# Patient Record
Sex: MALE | Race: WHITE | NOT HISPANIC OR LATINO | Employment: FULL TIME | URBAN - METROPOLITAN AREA
[De-identification: names, ages, dates, MRNs, and addresses within clinical notes are randomized per-mention and may not be internally consistent; named-entity substitution may affect disease eponyms.]

---

## 2024-07-17 NOTE — PROGRESS NOTES
"Chief Complaint  Establish Care (New pt - fasting ) and Sleeping Problem (Requesting referral to sleep med - pt reports he had a sleep study ~2006 and was told he had an abnormal brain wave, but has never had a seizure so he did not continue to fu. Pt reports \"terrible sleep\" and snoring)    Subjective        Jaime Newton presents to [facility]  History of Present Illness  Jaime Newton is a 23 y.o. male who presents to the clinic today to establish care.  He denies significant past medical history.  He does have a family history of heart attack and stroke in his father. Father has history of alcohol abuse and smoking.  Patient does fluctuate between chew tobacco and vaping.    He has had sleep issues. He had a sleep study in 2006 that showed and an abnormal brain wave. He saw a specialist, but no concern given he did not have any seizures.  He has been waking up multiple times in the evening and found to be gasping for air and his significant other has also noticed snoring.  He has been wearing a watch that has been tracking his sleep.  He denies daytime drowsiness, but does get more tired earlier in the evenings around 6-7 pm. He denies trouble getting to sleep. He denies anxiety.       Objective   Vital Signs:  /76   Pulse 74   Temp 98 °F (36.7 °C)   Ht 177.8 cm (70\")   Wt 111 kg (244 lb)   SpO2 98%   BMI 35.01 kg/m²   Estimated body mass index is 35.01 kg/m² as calculated from the following:    Height as of this encounter: 177.8 cm (70\").    Weight as of this encounter: 111 kg (244 lb).             Physical Exam  Vitals reviewed.   Constitutional:       General: He is not in acute distress.     Appearance: Normal appearance. He is not ill-appearing, toxic-appearing or diaphoretic.   HENT:      Head: Normocephalic and atraumatic.   Eyes:      General: No scleral icterus.        Right eye: No discharge.         Left eye: No discharge.      Extraocular Movements: " Extraocular movements intact.   Pulmonary:      Effort: Pulmonary effort is normal. No respiratory distress.   Neurological:      General: No focal deficit present.      Mental Status: He is alert and oriented to person, place, and time.      Motor: No weakness.      Gait: Gait normal.   Psychiatric:         Mood and Affect: Mood normal.         Behavior: Behavior normal.        Result Review :                     Assessment and Plan     Diagnoses and all orders for this visit:    1. Snoring (Primary)  -     Ambulatory Referral to Sleep Medicine    2. Screening for deficiency anemia  -     Cancel: CBC w AUTO Differential  -     CBC w AUTO Differential    3. Screening for endocrine, metabolic and immunity disorder  -     Cancel: Comprehensive metabolic panel  -     Comprehensive metabolic panel    4. Screening for lipid disorders  -     Cancel: Lipid Panel  -     Lipid Panel    5. Screening for diabetes mellitus  -     Cancel: Hemoglobin A1c  -     Hemoglobin A1c    6. Screening for thyroid disorder  -     Cancel: TSH  -     TSH    7. Need for hepatitis C screening test  -     Hepatitis C antibody      Given apneic episodes and snoring, patient has been referred to sleep medicine.  Routine labs obtained today.  Plan for routine physical in 6 months.         Follow Up     Return in about 6 months (around 1/18/2025) for Annual physical- labs 1-2 weeks prior.  Patient was given instructions and counseling regarding his condition or for health maintenance advice. Please see specific information pulled into the AVS if appropriate.       Electronically signed by CONNIE Peter, 07/18/24, 3:23 PM EDT.

## 2024-07-18 ENCOUNTER — OFFICE VISIT (OUTPATIENT)
Dept: FAMILY MEDICINE CLINIC | Facility: CLINIC | Age: 24
End: 2024-07-18
Payer: COMMERCIAL

## 2024-07-18 VITALS
BODY MASS INDEX: 34.93 KG/M2 | DIASTOLIC BLOOD PRESSURE: 76 MMHG | TEMPERATURE: 98 F | HEIGHT: 70 IN | OXYGEN SATURATION: 98 % | HEART RATE: 74 BPM | WEIGHT: 244 LBS | SYSTOLIC BLOOD PRESSURE: 116 MMHG

## 2024-07-18 DIAGNOSIS — Z13.228 SCREENING FOR ENDOCRINE, METABOLIC AND IMMUNITY DISORDER: ICD-10-CM

## 2024-07-18 DIAGNOSIS — Z13.0 SCREENING FOR DEFICIENCY ANEMIA: ICD-10-CM

## 2024-07-18 DIAGNOSIS — R06.83 SNORING: Primary | ICD-10-CM

## 2024-07-18 DIAGNOSIS — Z13.29 SCREENING FOR THYROID DISORDER: ICD-10-CM

## 2024-07-18 DIAGNOSIS — Z13.29 SCREENING FOR ENDOCRINE, METABOLIC AND IMMUNITY DISORDER: ICD-10-CM

## 2024-07-18 DIAGNOSIS — Z13.1 SCREENING FOR DIABETES MELLITUS: ICD-10-CM

## 2024-07-18 DIAGNOSIS — Z13.0 SCREENING FOR ENDOCRINE, METABOLIC AND IMMUNITY DISORDER: ICD-10-CM

## 2024-07-18 DIAGNOSIS — Z13.220 SCREENING FOR LIPID DISORDERS: ICD-10-CM

## 2024-07-18 DIAGNOSIS — Z11.59 NEED FOR HEPATITIS C SCREENING TEST: ICD-10-CM

## 2024-07-18 PROCEDURE — 99203 OFFICE O/P NEW LOW 30 MIN: CPT | Performed by: NURSE PRACTITIONER

## 2024-07-19 LAB
ALBUMIN SERPL-MCNC: 4.7 G/DL (ref 3.5–5.2)
ALBUMIN/GLOB SERPL: 1.9 G/DL
ALP SERPL-CCNC: 57 U/L (ref 39–117)
ALT SERPL-CCNC: 30 U/L (ref 1–41)
AST SERPL-CCNC: 27 U/L (ref 1–40)
BASOPHILS # BLD AUTO: 0.05 10*3/MM3 (ref 0–0.2)
BASOPHILS NFR BLD AUTO: 0.6 % (ref 0–1.5)
BILIRUB SERPL-MCNC: 1.4 MG/DL (ref 0–1.2)
BUN SERPL-MCNC: 15 MG/DL (ref 6–20)
BUN/CREAT SERPL: 11.5 (ref 7–25)
CALCIUM SERPL-MCNC: 9.8 MG/DL (ref 8.6–10.5)
CHLORIDE SERPL-SCNC: 101 MMOL/L (ref 98–107)
CHOLEST SERPL-MCNC: 160 MG/DL (ref 0–200)
CO2 SERPL-SCNC: 26.9 MMOL/L (ref 22–29)
CREAT SERPL-MCNC: 1.31 MG/DL (ref 0.76–1.27)
EGFRCR SERPLBLD CKD-EPI 2021: 78 ML/MIN/1.73
EOSINOPHIL # BLD AUTO: 0.36 10*3/MM3 (ref 0–0.4)
EOSINOPHIL NFR BLD AUTO: 4.6 % (ref 0.3–6.2)
ERYTHROCYTE [DISTWIDTH] IN BLOOD BY AUTOMATED COUNT: 12 % (ref 12.3–15.4)
GLOBULIN SER CALC-MCNC: 2.5 GM/DL
GLUCOSE SERPL-MCNC: 79 MG/DL (ref 65–99)
HBA1C MFR BLD: 5.2 % (ref 4.8–5.6)
HCT VFR BLD AUTO: 46.7 % (ref 37.5–51)
HCV IGG SERPL QL IA: NON REACTIVE
HDLC SERPL-MCNC: 41 MG/DL (ref 40–60)
HGB BLD-MCNC: 16.1 G/DL (ref 13–17.7)
IMM GRANULOCYTES # BLD AUTO: 0.01 10*3/MM3 (ref 0–0.05)
IMM GRANULOCYTES NFR BLD AUTO: 0.1 % (ref 0–0.5)
LDLC SERPL CALC-MCNC: 95 MG/DL (ref 0–100)
LYMPHOCYTES # BLD AUTO: 3.02 10*3/MM3 (ref 0.7–3.1)
LYMPHOCYTES NFR BLD AUTO: 38.4 % (ref 19.6–45.3)
MCH RBC QN AUTO: 32.1 PG (ref 26.6–33)
MCHC RBC AUTO-ENTMCNC: 34.5 G/DL (ref 31.5–35.7)
MCV RBC AUTO: 93.2 FL (ref 79–97)
MONOCYTES # BLD AUTO: 0.52 10*3/MM3 (ref 0.1–0.9)
MONOCYTES NFR BLD AUTO: 6.6 % (ref 5–12)
NEUTROPHILS # BLD AUTO: 3.91 10*3/MM3 (ref 1.7–7)
NEUTROPHILS NFR BLD AUTO: 49.7 % (ref 42.7–76)
NRBC BLD AUTO-RTO: 0 /100 WBC (ref 0–0.2)
PLATELET # BLD AUTO: 264 10*3/MM3 (ref 140–450)
POTASSIUM SERPL-SCNC: 4.4 MMOL/L (ref 3.5–5.2)
PROT SERPL-MCNC: 7.2 G/DL (ref 6–8.5)
RBC # BLD AUTO: 5.01 10*6/MM3 (ref 4.14–5.8)
SODIUM SERPL-SCNC: 137 MMOL/L (ref 136–145)
TRIGL SERPL-MCNC: 133 MG/DL (ref 0–150)
TSH SERPL DL<=0.005 MIU/L-ACNC: 0.74 UIU/ML (ref 0.27–4.2)
VLDLC SERPL CALC-MCNC: 24 MG/DL (ref 5–40)
WBC # BLD AUTO: 7.87 10*3/MM3 (ref 3.4–10.8)

## 2024-07-23 ENCOUNTER — OFFICE VISIT (OUTPATIENT)
Dept: SLEEP MEDICINE | Facility: HOSPITAL | Age: 24
End: 2024-07-23
Payer: COMMERCIAL

## 2024-07-23 VITALS — HEIGHT: 70 IN | OXYGEN SATURATION: 98 % | BODY MASS INDEX: 35.19 KG/M2 | WEIGHT: 245.8 LBS | HEART RATE: 107 BPM

## 2024-07-23 DIAGNOSIS — E66.9 CLASS 2 OBESITY WITH BODY MASS INDEX (BMI) OF 35.0 TO 35.9 IN ADULT, UNSPECIFIED OBESITY TYPE, UNSPECIFIED WHETHER SERIOUS COMORBIDITY PRESENT: ICD-10-CM

## 2024-07-23 DIAGNOSIS — R06.83 SNORING: Primary | ICD-10-CM

## 2024-07-23 DIAGNOSIS — G47.33 OSA (OBSTRUCTIVE SLEEP APNEA): ICD-10-CM

## 2024-07-23 PROCEDURE — 99204 OFFICE O/P NEW MOD 45 MIN: CPT | Performed by: PSYCHIATRY & NEUROLOGY

## 2024-07-23 PROCEDURE — G0463 HOSPITAL OUTPT CLINIC VISIT: HCPCS

## 2024-07-23 NOTE — PROGRESS NOTES
"  Reason for Consultation: Sleep apnea        Patient Care Team:  Lityz Monroy APRN as PCP - General (Nurse Practitioner)  Keri Cline MD, MPH as Consulting Physician (Sleep Medicine)      History of present illness:    Thank you for asking me to see your patient.  The patient is a 23 y.o. male has been snoring loudly for some time with nonrestorative sleep, leaving him tired in the afternoon.  His significant other tells him that he snores and stops breathing and gasps and this has been ongoing for about 3 years.  Habitual bedtime is 11 PM to midnight during the week and on the weekends it can be as late as 2 AM.  During the week he gets up at 6:30 AM on the weekends closer to 9 AM.  He is tired in both cases.  Gained 20 pounds in the past 5 years Works as  has difficulty falling and staying asleep and is a restless sleeper getting up 1 time per night to go to the bathroom.  He consumes about 12 beers per week started using tobacco at 16 and has not quit and neither is significant other he has 1 cup of coffee per day.    [place]: 10    Data Reviewed: I reviewed his sleep questionnaire and medical chart      PMH:  History reviewed. No pertinent past medical history.       Allergies:  Patient has no known allergies.     Medication Review:   No current outpatient medications on file prior to visit.     No current facility-administered medications on file prior to visit.         Vital Signs:    Vitals:    07/23/24 1300   Pulse: 107   SpO2: 98%   Weight: 111 kg (245 lb 12.8 oz)   Height: 177.8 cm (70\")        Body mass index is 35.27 kg/m².  Neck Circumference: 18 inches  .BMIFOLLOWUP  Class 2 Severe Obesity (BMI >=35 and <=39.9). Obesity-related health conditions include the following: obstructive sleep apnea. Obesity is newly identified. BMI is is above average; BMI management plan is completed. We discussed portion control and increasing exercise.      Physical Exam:    Constitutional:  Well " developed 23 y.o. male that appears in no apparent distress.  Awake & oriented times 3.  Normal mood with normal recent and remote memory and normal judgement.  Eyes:  Conjunctivae normal.  Oropharynx: Moist mucous membranes without exudate and Mallampati 2 with macroglossia  Neck: Trachea midline  Respiratory: Effort is not labored  Cardiovascular: Radial pulse regular  Musculoskeletal: Gait appears normal, no digital clubbing evident, no pre-tibial edema        Impression:   Encounter Diagnoses   Name Primary?    Snoring Yes    Class 2 obesity with body mass index (BMI) of 35.0 to 35.9 in adult, unspecified obesity type, unspecified whether serious comorbidity present     CATARINA (obstructive sleep apnea)      Patient's BMI is Body mass index is 35.27 kg/m².    Patient is an appropriate candidate for home sleep apnea test    Plan:    Home sleep apnea test    The patient should practice good sleep hygiene measures.      Daily weight loss might be beneficial in this patient who has a Body mass index is 35.27 kg/m².      Pathophysiology of CATARINA described to the patient.  Cardiovascular complications of untreated CATARINA also reviewed.      The patient was cautioned about the dangers of drowsy driving.    Victor M Cline MD  Sleep Medicine  07/23/24  14:13 EDT

## 2024-07-29 ENCOUNTER — HOSPITAL ENCOUNTER (OUTPATIENT)
Dept: SLEEP MEDICINE | Facility: HOSPITAL | Age: 24
Discharge: HOME | End: 2024-07-29
Admitting: PSYCHIATRY & NEUROLOGY
Payer: COMMERCIAL

## 2024-07-29 DIAGNOSIS — G47.33 OSA (OBSTRUCTIVE SLEEP APNEA): ICD-10-CM

## 2024-07-29 DIAGNOSIS — E66.9 CLASS 2 OBESITY WITH BODY MASS INDEX (BMI) OF 35.0 TO 35.9 IN ADULT, UNSPECIFIED OBESITY TYPE, UNSPECIFIED WHETHER SERIOUS COMORBIDITY PRESENT: ICD-10-CM

## 2024-07-29 DIAGNOSIS — R06.83 SNORING: ICD-10-CM

## 2024-07-29 PROCEDURE — 95806 SLEEP STUDY UNATT&RESP EFFT: CPT

## 2024-08-01 ENCOUNTER — TELEPHONE (OUTPATIENT)
Dept: SLEEP MEDICINE | Facility: HOSPITAL | Age: 24
End: 2024-08-01
Payer: COMMERCIAL

## 2024-08-01 DIAGNOSIS — G47.33 OSA (OBSTRUCTIVE SLEEP APNEA): Primary | ICD-10-CM

## 2024-08-01 DIAGNOSIS — E66.9 CLASS 2 OBESITY WITH BODY MASS INDEX (BMI) OF 35.0 TO 35.9 IN ADULT, UNSPECIFIED OBESITY TYPE, UNSPECIFIED WHETHER SERIOUS COMORBIDITY PRESENT: ICD-10-CM

## 2024-10-28 ENCOUNTER — OFFICE VISIT (OUTPATIENT)
Dept: SLEEP MEDICINE | Facility: HOSPITAL | Age: 24
End: 2024-10-28
Payer: COMMERCIAL

## 2024-10-28 VITALS — WEIGHT: 255 LBS | HEIGHT: 70 IN | HEART RATE: 91 BPM | BODY MASS INDEX: 36.51 KG/M2 | OXYGEN SATURATION: 97 %

## 2024-10-28 DIAGNOSIS — G47.33 OSA ON CPAP: Primary | ICD-10-CM

## 2024-10-28 DIAGNOSIS — E66.09 CLASS 2 OBESITY DUE TO EXCESS CALORIES WITHOUT SERIOUS COMORBIDITY WITH BODY MASS INDEX (BMI) OF 36.0 TO 36.9 IN ADULT: ICD-10-CM

## 2024-10-28 DIAGNOSIS — E66.812 CLASS 2 OBESITY DUE TO EXCESS CALORIES WITHOUT SERIOUS COMORBIDITY WITH BODY MASS INDEX (BMI) OF 36.0 TO 36.9 IN ADULT: ICD-10-CM

## 2024-10-28 PROCEDURE — 99214 OFFICE O/P EST MOD 30 MIN: CPT | Performed by: PSYCHIATRY & NEUROLOGY

## 2024-10-28 PROCEDURE — G0463 HOSPITAL OUTPT CLINIC VISIT: HCPCS

## 2024-10-28 NOTE — PROGRESS NOTES
"Follow Up Sleep Disorders Center Note       Primary Care Physician: Litzy Monroy, APRN    Interval History:   The patient is a 24 y.o. male      History of Present Illness  The patient presents for evaluation of sleep apnea.    He reports a decrease in the frequency of waking up during the night, which he attributes to the use of a full-face mask. Initially, he experienced some issues with air leakage due to loose straps, but after tightening them, he has not encountered any further problems. He estimates his nightly sleep duration to be around 6 hours. He has been using the mask for approximately 3 months and has noticed significant improvements in his sleep quality.         Downloaded PAP Data Evaluated For Therapeutic Response and Compliance:  DME is [email or web address].  Downloads between 9/25/2024 and 10/24/2024.  Average usage is 5 hours and 2 minutes and 100% of the last 30 days.  Average AHI is 1.7.  PAP pressure is 10.8 CWP.    Review of Systems:    A complete review of systems was done and all were negative with the exception of none    Social History:    Social History     Socioeconomic History    Marital status: Single   Tobacco Use    Smoking status: Never     Passive exposure: Past    Smokeless tobacco: Current     Types: Chew    Tobacco comments:     On/off between vapes and chew since '17   Vaping Use    Vaping status: Every Day    Substances: Nicotine   Substance and Sexual Activity    Alcohol use: Yes     Alcohol/week: 12.0 standard drinks of alcohol     Types: 12 Cans of beer per week    Drug use: Not Currently     Types: Marijuana     Comment: Former daily marijuana user. Currently only socially    Sexual activity: Yes     Partners: Female     Birth control/protection: I.U.D.       Allergies:  Patient has no known allergies.     Medication Review:  Reviewed.      Vital Signs:    Vitals:    10/28/24 1500   Pulse: 91   SpO2: 97%   Weight: 116 kg (255 lb)   Height: 177.8 cm (70\")     Body mass index is " 36.59 kg/m².  .BMIFOLLOWUP    Physical Exam:    Constitutional:  Well developed 24 y.o. male that appears in no apparent distress.  Awake & oriented times 3.  Normal mood with normal recent and remote memory and normal judgement.  Eyes:  Conjunctivae normal.      Self-administered Rogers Sleepiness Scale test results:    0-5 Lower normal daytime sleepiness  6-10 Higher normal daytime sleepiness  11-12 Mild, 13-15 Moderate, & 16-24 Severe excessive daytime sleepiness        I have reviewed the above results and compared them with the patient's last downloads and reviewed with the patient.    Impression:     Encounter Diagnoses   Name Primary?    CATARINA on CPAP Yes    Class 2 obesity due to excess calories without serious comorbidity with body mass index (BMI) of 36.0 to 36.9 in adult          Assessment & Plan  1. Sleep Apnea.  He is using the CPAP device for approximately 5 hours per night, with an apnea-hypopnea index (AHI) of 1.7 or less than 2, which is a significant improvement from his previous AHI of 54. The pressure settings range from 5 to 20, with him using between 11 and 18. Despite a few large leaks, his overall leak rate is low. He has met the compliance criteria, using the device for more than 4 hours per night on over 70% of nights, achieving 100% compliance over 30 days. No changes are recommended at this time. He is advised to replace his equipment and cushions as needed, approximately every 3 months, depending on wear.    Follow-up  Return in 1 year for follow-up.         Good sleep hygiene measures should be maintained.  Weight loss would be beneficial in this patient who who has obesity class II by Body mass index is 36.59 kg/m².      After evaluating the patient and assessing results available, the patient is benefiting from the treatment being provided.     The patient will continue CPAP.  Potential side effects of PAP therapy reviewed and addressed as needed.  After clinical evaluation and review  of downloads, I recommend no changes to the patient's pressures.      I answered all of the patient's questions.  The patient will call the Sleep Disorder Center for any problems and will follow up 1 year.    Patient or patient representative verbalized consent for the use of Ambient Listening during the visit with  Victor M Cline MD for chart documentation. 10/28/2024  15:27 EDT           Victor M Cline MD  Sleep Medicine  10/28/24  15:27 EDT

## 2025-01-16 DIAGNOSIS — R79.89 ABNORMAL SERUM CREATININE LEVEL: Primary | ICD-10-CM

## 2025-01-27 ENCOUNTER — OFFICE VISIT (OUTPATIENT)
Dept: FAMILY MEDICINE CLINIC | Facility: CLINIC | Age: 25
End: 2025-01-27
Payer: COMMERCIAL

## 2025-01-27 VITALS
BODY MASS INDEX: 37.65 KG/M2 | WEIGHT: 263 LBS | OXYGEN SATURATION: 98 % | SYSTOLIC BLOOD PRESSURE: 120 MMHG | HEIGHT: 70 IN | HEART RATE: 86 BPM | TEMPERATURE: 98 F | DIASTOLIC BLOOD PRESSURE: 86 MMHG

## 2025-01-27 DIAGNOSIS — Z00.00 ENCOUNTER FOR HEALTH MAINTENANCE EXAMINATION IN ADULT: Primary | ICD-10-CM

## 2025-01-27 DIAGNOSIS — Z23 NEED FOR VACCINATION: ICD-10-CM

## 2025-01-27 DIAGNOSIS — R53.83 FATIGUE, UNSPECIFIED TYPE: ICD-10-CM

## 2025-01-27 DIAGNOSIS — E55.9 VITAMIN D DEFICIENCY: ICD-10-CM

## 2025-01-27 DIAGNOSIS — Z13.220 SCREENING FOR LIPID DISORDERS: ICD-10-CM

## 2025-01-27 PROCEDURE — 90471 IMMUNIZATION ADMIN: CPT | Performed by: NURSE PRACTITIONER

## 2025-01-27 PROCEDURE — 99213 OFFICE O/P EST LOW 20 MIN: CPT | Performed by: NURSE PRACTITIONER

## 2025-01-27 PROCEDURE — 99395 PREV VISIT EST AGE 18-39: CPT | Performed by: NURSE PRACTITIONER

## 2025-01-27 PROCEDURE — 90715 TDAP VACCINE 7 YRS/> IM: CPT | Performed by: NURSE PRACTITIONER

## 2025-01-27 NOTE — PROGRESS NOTES
Chief Complaint  Annual Exam (Physical - CMP 1/21/25, fasting - pt requesting lipid, vidD, B12, MMA, free T4/T3, and homocysteine - CHARBEL 6 PHQ 7)    Subjective        Jaime Newton presents to [facility]  History of Present Illness  Jaime Newton is a 24 y.o. male who presents to the clinic today for his annual physical exam.  He has a history of sleep apnea.  He does have some fatigue.    Annual Exam.  Diet: He reports unhealthy diet, bread, meat and cheese. He has about 1 liter of water a day.   Exercise: He is not exercising outside of job as an .   : Occasional issues maintaining and achieving erection.  Immunizations: Due for TDAP.  Colon cancer screening: His maternal grandfather had colon cancer.   Sleep/mental health: Following with sleep medicine. He is using CPAP.  He gets about 5-6 hours of sleep a night. He has some depressive symptoms. [facility]. No suicidal ideation.   Sexual health: One female partner.   Social history: He has about 12-18 beers per week. He is smoking more weed recently, typically daily. He vapes.    Vision/dental: Wears contacts, due for eye exam.   Family history: As listed below.     Health Maintenance   Topic Date Due    INFLUENZA VACCINE  03/31/2025 (Originally 7/1/2024)    COVID-19 Vaccine (3 - 2024-25 season) 08/29/2025 (Originally 9/1/2024)    BMI FOLLOWUP  07/23/2025    ANNUAL PHYSICAL  01/27/2026    TDAP/TD VACCINES (2 - Td or Tdap) 01/27/2035    HEPATITIS C SCREENING  Completed    Pneumococcal Vaccine 0-64  Aged Out       Family History   Problem Relation Age of Onset    No Known Problems Mother     Alcohol abuse Father     Stroke Father     COPD Maternal Grandmother     Cancer Maternal Grandfather     Colon cancer Maternal Grandfather     No Known Problems Paternal Grandmother     No Known Problems Paternal Grandfather     Cancer Maternal Aunt         Pt estimates kidney cancer         Objective   Vital  "Signs:  /86   Pulse 86   Temp 98 °F (36.7 °C)   Ht 177.8 cm (70\")   Wt 119 kg (263 lb)   SpO2 98%   BMI 37.74 kg/m²   Estimated body mass index is 37.74 kg/m² as calculated from the following:    Height as of this encounter: 177.8 cm (70\").    Weight as of this encounter: 119 kg (263 lb).         Physical Exam  Vitals reviewed.   Constitutional:       General: He is not in acute distress.     Appearance: Normal appearance. He is well-developed. He is not ill-appearing, toxic-appearing or diaphoretic.   HENT:      Head: Normocephalic and atraumatic.   Eyes:      General: No scleral icterus.        Right eye: No discharge.         Left eye: No discharge.      Extraocular Movements: Extraocular movements intact.   Neck:      Thyroid: No thyroid mass, thyromegaly or thyroid tenderness.   Cardiovascular:      Rate and Rhythm: Normal rate and regular rhythm.      Heart sounds: Normal heart sounds.   Pulmonary:      Effort: Pulmonary effort is normal.      Breath sounds: Normal breath sounds.   Abdominal:      General: Bowel sounds are normal.      Palpations: Abdomen is soft.   Musculoskeletal:         General: Normal range of motion.      Cervical back: Normal range of motion.      Right lower leg: No edema.      Left lower leg: No edema.   Skin:     General: Skin is warm.   Neurological:      General: No focal deficit present.      Mental Status: He is alert and oriented to person, place, and time.   Psychiatric:         Behavior: Behavior normal.        Result Review :    Common labs          7/18/2024    14:21 1/21/2025    15:38   Common Labs   Glucose 79  89    BUN 15  15    Creatinine 1.31  1.44    Sodium 137  140    Potassium 4.4  4.2    Chloride 101  100    Calcium 9.8  10.1    Total Protein 7.2  7.2    Albumin 4.7  4.4    Total Bilirubin 1.4  1.0    Alkaline Phosphatase 57  60    AST (SGOT) 27  26    ALT (SGPT) 30  30    WBC 7.87     Hemoglobin 16.1     Hematocrit 46.7     Platelets 264     Total " Cholesterol 160     Triglycerides 133     HDL Cholesterol 41     LDL Cholesterol  95     Hemoglobin A1C 5.20                 Assessment and Plan   Diagnoses and all orders for this visit:    1. Encounter for health maintenance examination in adult (Primary)    2. Need for vaccination  -     Tdap Vaccine Greater Than or Equal To 6yo IM    3. Fatigue, unspecified type  -     T4, free  -     TSH  -     Homocysteine  -     Vitamin D,25-Hydroxy  -     Methylmalonic Acid, Serum  -     Vitamin B12 & Folate  -     Testosterone (Free & Total), LC / MS    4. Screening for lipid disorders  -     Lipid Panel    5. Vitamin D deficiency  -     Vitamin D,25-Hydroxy      Patient is a pleasant 24-year-old male who presents for his annual physical exam.  Patient's vital signs within normal limits.  Recommended diet and exercise modifications.  Given recent creatinine elevation, recommended drinking at least 120 ounces of water a day.  Discussed reducing alcohol intake.  Given his symptoms of fatigue and some issues with erectile dysfunction, recommended moving forth with lab work.  Will follow-up after test results. Keep a close eye on kidney function. Recheck CMP in 6 months with microalbumin.          Follow Up   Return in about 3 months (around 4/27/2025), or if symptoms worsen or fail to improve, for Recheck.  Patient was given instructions and counseling regarding his condition or for health maintenance advice. Please see specific information pulled into the AVS if appropriate.       Electronically signed by CONNIE Peter, 01/30/25, 2:29 PM EST.

## 2025-01-28 ENCOUNTER — TELEPHONE (OUTPATIENT)
Dept: FAMILY MEDICINE CLINIC | Facility: CLINIC | Age: 25
End: 2025-01-28

## 2025-01-28 NOTE — TELEPHONE ENCOUNTER
Caller: Jaime Newton    Relationship: Self    Best call back number: [phone]     What form or medical record are you requesting: DOCTOR'S NOTE FOR WORK FOR DATES 01/20/25 AND 01/27/25     Who is requesting this form or medical record from you: EMPLOYER     How would you like to receive the form or medical records (pick-up, mail, fax): UPLOAD TO [employer]    Additional notes: PATIENT STATED HE CAME IN FOR APPOINTMENTS ON 01/20/25 AT 3:30PM AND YESTERDAY 01/27/25 AT 3:00PM. PLEASE ADVISE.

## 2025-02-05 LAB
25(OH)D3+25(OH)D2 SERPL-MCNC: 18.1 NG/ML (ref 30–100)
CHOLEST SERPL-MCNC: 148 MG/DL (ref 100–199)
FOLATE SERPL-MCNC: 8.7 NG/ML
HCYS SERPL-SCNC: 12 UMOL/L (ref 0–14.5)
HDLC SERPL-MCNC: 42 MG/DL
LDLC SERPL CALC-MCNC: 81 MG/DL (ref 0–99)
METHYLMALONATE SERPL-SCNC: 179 NMOL/L (ref 0–378)
T4 FREE SERPL-MCNC: 1.19 NG/DL (ref 0.82–1.77)
TESTOST FREE SERPL-MCNC: 11.7 PG/ML (ref 9.3–26.5)
TESTOST SERPL-MCNC: 488.1 NG/DL (ref 264–916)
TRIGL SERPL-MCNC: 143 MG/DL (ref 0–149)
TSH SERPL DL<=0.005 MIU/L-ACNC: 1.36 UIU/ML (ref 0.45–4.5)
VIT B12 SERPL-MCNC: 359 PG/ML (ref 232–1245)
VLDLC SERPL CALC-MCNC: 25 MG/DL (ref 5–40)

## 2025-07-10 ENCOUNTER — OFFICE VISIT (OUTPATIENT)
Dept: FAMILY MEDICINE CLINIC | Facility: CLINIC | Age: 25
End: 2025-07-10
Payer: COMMERCIAL

## 2025-07-10 VITALS
HEIGHT: 70 IN | HEART RATE: 82 BPM | TEMPERATURE: 98 F | DIASTOLIC BLOOD PRESSURE: 72 MMHG | BODY MASS INDEX: 33.79 KG/M2 | SYSTOLIC BLOOD PRESSURE: 112 MMHG | WEIGHT: 236 LBS | OXYGEN SATURATION: 98 %

## 2025-07-10 DIAGNOSIS — F32.A ANXIETY AND DEPRESSION: Primary | ICD-10-CM

## 2025-07-10 DIAGNOSIS — F41.9 ANXIETY AND DEPRESSION: Primary | ICD-10-CM

## 2025-07-10 PROCEDURE — 99213 OFFICE O/P EST LOW 20 MIN: CPT | Performed by: NURSE PRACTITIONER

## 2025-07-10 RX ORDER — BUPROPION HYDROCHLORIDE 150 MG/1
150 TABLET ORAL DAILY
Qty: 60 TABLET | Refills: 0 | Status: SHIPPED | OUTPATIENT
Start: 2025-07-10

## 2025-07-10 NOTE — PROGRESS NOTES
Chief Complaint  Anxiety (Anxiety has intensified significantly in last week. Some improvement last night w Ashwagandha and magnesium. No rx hx, Wellbutrin was recommended - CHARBEL 19 PHQ 14 )    Subjective          Jaime Newton presents to [facility]  Depression  Visit:  Initial  Initial Visit:     Onset:  At an unknown time    Progression since onset:  Worse    Symptoms: confusion, excessive worry, irritability, malaise/fatigue and difficulty controlling mood      Symptoms: no dizziness, does not have insomnia, no obsessions and no hypersomnia      Frequency:  Constantly    Severity:  Severe    Nighttime awakenings:     PMH Includes: depression        History of Present Illness  The patient presents for evaluation of depression and anxiety.    He has been experiencing symptoms of depression and anxiety, with anxiety being more prominent. These symptoms worsened last week during a vacation, particularly following an incident [place] a casino where he consumed alcohol excessively. This event caused significant strain in his relationship, contributing to his current emotional state. He reports feeling fatigued, overwhelmed with emotions, sadness, and a sense of impending doom. He does not report any suicidal ideation or irritability. There is no history of seizures. He has a strong support system in place. He has been attending therapy sessions for nearly a year, which have provided some relief. However, he reports that the severity of his symptoms over the past 4 to 5 days is unprecedented. He has reduced his alcohol consumption to no more than once a week and only drinks socially. He has been taking ashwagandha and magnesium supplements, which have helped manage his anxiety.    SOCIAL HISTORY  The patient admits to drinking alcohol but has significantly reduced his intake, currently drinking no more than once a week.     Over the last two weeks, how often have you been bothered by the  "following problems?  Feeling nervous, anxious or on edge: Nearly every day  Not being able to stop or control worrying: Nearly every day  Worrying too much about different things: Nearly every day  Trouble Relaxing: Nearly every day  Being so restless that it is hard to sit still: Several days  Becoming easily annoyed or irritable: Nearly every day  Feeling afraid as if something awful might happen: Nearly every day  CHARBEL 7 Total Score: 19  If you checked any problems, how difficult have these problems made it for you to do your work, take care of things at home, or get along with other people: Very difficult}    PHQ-9 Depression Screening  Little interest or pleasure in doing things? More than half the days   Feeling down, depressed, or hopeless? More than half the days   PHQ-2 Total Score 4   Trouble falling or staying asleep, or sleeping too much? Several days   Feeling tired or having little energy? Nearly every day   Poor appetite or overeating? Several days   Feeling bad about yourself - or that you are a failure or have let yourself or your family down? More than half the days   Trouble concentrating on things, such as reading the newspaper or watching television? More than half the days   Moving or speaking so slowly that other people could have noticed? Or the opposite - being so fidgety or restless that you have been moving around a lot more than usual? Not at all     Thoughts that you would be better off dead, or of hurting yourself in some way? Several days   PHQ-9 Total Score 14   If you checked off any problems, how difficult have these problems made it for you to do your work, take care of things at home, or get along with other people? Very difficult         Objective   Vital Signs:   /72   Pulse 82   Temp 98 °F (36.7 °C)   Ht 177.8 cm (70\")   Wt 107 kg (236 lb)   SpO2 98%   BMI 33.86 kg/m²       Physical Exam  Vitals reviewed.   Constitutional:       General: He is not in acute " distress.     Appearance: Normal appearance. He is not ill-appearing, toxic-appearing or diaphoretic.   HENT:      Head: Normocephalic and atraumatic.   Eyes:      General: No scleral icterus.        Right eye: No discharge.         Left eye: No discharge.      Extraocular Movements: Extraocular movements intact.   Pulmonary:      Effort: Pulmonary effort is normal. No respiratory distress.   Neurological:      General: No focal deficit present.      Mental Status: He is alert and oriented to person, place, and time.      Motor: No weakness.      Gait: Gait normal.   Psychiatric:         Mood and Affect: Mood normal.         Behavior: Behavior normal.          Result Review :                  Results  Labs   - Testosterone levels: Normal       Assessment and Plan    Diagnoses and all orders for this visit:    1. Anxiety and depression (Primary)  -     buPROPion XL (Wellbutrin XL) 150 MG 24 hr tablet; Take 1 tablet by mouth Daily.  Dispense: 60 tablet; Refill: 0        Assessment & Plan  1. Depression.  - Reports worsening symptoms of depression over the past week, including fatigue, sadness, and an impending sense of doom.  - Therapy has been ongoing for almost a year but has not significantly alleviated symptoms.  - Wellbutrin discussed as a treatment option due to its lower risk of sexual side effects compared to other antidepressants like Lexapro or Zoloft.  - Prescription for Wellbutrin sent to pharmacy; advised to take it in the evening initially and switch to morning dosing if it causes insomnia. Alcohol should be avoided due to the risk of lowering the seizure threshold. Full benefits may take 2 to 3 weeks, and contact via MyChart if any side effects or concerns arise. Alternative medications will be considered if decreased libido or other side effects occur.    2. Anxiety.  - Reports increased anxiety, characterized by feeling worn down, overflow of emotions, and a sense of impending doom.  - Advised to  continue counseling sessions once a week or every other week.  - Potential benefits of Wellbutrin for anxiety discussed.  - Advised to continue taking ashwagandha and magnesium, which have been helpful. If anxiety is not well controlled within 2 to 3 weeks, an as-needed anxiety medication may be considered.    Follow-up  - A follow-up visit is scheduled in 6 weeks.        Follow Up   Return in about 6 weeks (around 8/21/2025), or if symptoms worsen or fail to improve, for Recheck- anxiety and depression .    Patient was given instructions and counseling regarding his condition or for health maintenance advice. Please see specific information pulled into the AVS if appropriate.       Transcribed from ambient dictation for CONNIE Peter by CONNIE Peter.  07/10/25   07:12 EDT    Patient or patient representative verbalized consent for the use of Ambient Listening during the visit with  CONNIE Peter for chart documentation. 7/10/2025  13:45 EDT    Electronically signed by CONNIE Peter, 07/10/25, 1:45 PM EDT.

## 2025-08-21 ENCOUNTER — OFFICE VISIT (OUTPATIENT)
Dept: FAMILY MEDICINE CLINIC | Facility: CLINIC | Age: 25
End: 2025-08-21
Payer: COMMERCIAL

## 2025-08-21 VITALS
HEIGHT: 70 IN | WEIGHT: 229 LBS | OXYGEN SATURATION: 98 % | BODY MASS INDEX: 32.78 KG/M2 | HEART RATE: 98 BPM | SYSTOLIC BLOOD PRESSURE: 110 MMHG | TEMPERATURE: 98.1 F | DIASTOLIC BLOOD PRESSURE: 76 MMHG

## 2025-08-21 DIAGNOSIS — R79.89 ABNORMAL SERUM CREATININE LEVEL: Primary | ICD-10-CM

## 2025-08-21 DIAGNOSIS — F41.9 ANXIETY AND DEPRESSION: ICD-10-CM

## 2025-08-21 DIAGNOSIS — F32.A ANXIETY AND DEPRESSION: ICD-10-CM

## 2025-08-21 PROCEDURE — 99213 OFFICE O/P EST LOW 20 MIN: CPT | Performed by: NURSE PRACTITIONER

## 2025-08-21 RX ORDER — BUPROPION HYDROCHLORIDE 300 MG/1
300 TABLET ORAL DAILY
Qty: 90 TABLET | Refills: 0 | Status: SHIPPED | OUTPATIENT
Start: 2025-08-21

## 2025-08-21 RX ORDER — HYDROXYZINE HYDROCHLORIDE 10 MG/1
10 TABLET, FILM COATED ORAL 3 TIMES DAILY PRN
Qty: 30 TABLET | Refills: 0 | Status: SHIPPED | OUTPATIENT
Start: 2025-08-21

## 2025-08-22 LAB
ALBUMIN/CREAT UR: 3 MG/G CREAT (ref 0–29)
BUN SERPL-MCNC: 13 MG/DL (ref 6–20)
BUN/CREAT SERPL: 9 (ref 9–20)
CALCIUM SERPL-MCNC: 10 MG/DL (ref 8.7–10.2)
CHLORIDE SERPL-SCNC: 101 MMOL/L (ref 96–106)
CO2 SERPL-SCNC: 24 MMOL/L (ref 20–29)
CREAT SERPL-MCNC: 1.44 MG/DL (ref 0.76–1.27)
CREAT UR-MCNC: 187.8 MG/DL
EGFRCR SERPLBLD CKD-EPI 2021: 69 ML/MIN/1.73
GLUCOSE SERPL-MCNC: 82 MG/DL (ref 70–99)
MICROALBUMIN UR-MCNC: 6.3 UG/ML
POTASSIUM SERPL-SCNC: 4.6 MMOL/L (ref 3.5–5.2)
SODIUM SERPL-SCNC: 140 MMOL/L (ref 134–144)